# Patient Record
Sex: FEMALE | Race: WHITE | NOT HISPANIC OR LATINO
[De-identification: names, ages, dates, MRNs, and addresses within clinical notes are randomized per-mention and may not be internally consistent; named-entity substitution may affect disease eponyms.]

---

## 2021-11-14 ENCOUNTER — TRANSCRIPTION ENCOUNTER (OUTPATIENT)
Age: 21
End: 2021-11-14

## 2021-11-14 ENCOUNTER — APPOINTMENT (OUTPATIENT)
Dept: AFTER HOURS CARE | Facility: EMERGENCY ROOM | Age: 21
End: 2021-11-14
Payer: COMMERCIAL

## 2021-11-14 DIAGNOSIS — S09.90XA UNSPECIFIED INJURY OF HEAD, INITIAL ENCOUNTER: ICD-10-CM

## 2021-11-14 PROBLEM — Z00.00 ENCOUNTER FOR PREVENTIVE HEALTH EXAMINATION: Status: ACTIVE | Noted: 2021-11-14

## 2021-11-14 PROCEDURE — 99203 OFFICE O/P NEW LOW 30 MIN: CPT | Mod: 95

## 2021-11-15 PROBLEM — S09.90XA MINOR HEAD INJURY WITHOUT LOSS OF CONSCIOUSNESS, INITIAL ENCOUNTER: Status: ACTIVE | Noted: 2021-11-15

## 2021-11-15 NOTE — HISTORY OF PRESENT ILLNESS
[Home] : at home, [unfilled] , at the time of the visit. [Other Location: e.g. Home (Enter Location, City,State)___] : at [unfilled] [Verbal consent obtained from patient] : the patient, [unfilled] [FreeTextEntry8] : 21F hx concussion ~6mo ago now p/w mild frontal HA x1d, worse with bright lights, which started after she bumped her head on a car door. No LOC, amnesia, blood thinners, sz, n/v/d. Pt st her sx are more mild than prior concussion. \par LMP 10/21

## 2021-11-15 NOTE — PLAN
[No new medications perscribed] : Treat in place: No new medications prescribed [FreeTextEntry1] : extensive anticipatory guidance\par apap\par work note\par pmd follow up

## 2021-11-20 ENCOUNTER — APPOINTMENT (OUTPATIENT)
Dept: AFTER HOURS CARE | Facility: EMERGENCY ROOM | Age: 21
End: 2021-11-20
Payer: COMMERCIAL

## 2021-11-20 DIAGNOSIS — F07.81 POSTCONCUSSIONAL SYNDROME: ICD-10-CM

## 2021-11-20 PROCEDURE — 99213 OFFICE O/P EST LOW 20 MIN: CPT | Mod: 95

## 2021-11-20 NOTE — PLAN
[No new medications perscribed] : Treat in place: No new medications prescribed [FreeTextEntry1] : extensive discussion over concussion mgmnt - encouraging patience, slow escalation of return to full activity, and\par follow up\par work note\par apap if needed\par pmd follow up

## 2021-11-20 NOTE — HISTORY OF PRESENT ILLNESS
[Home] : at home, [unfilled] , at the time of the visit. [Other Location: e.g. Home (Enter Location, City,State)___] : at [unfilled] [Verbal consent obtained from patient] : the patient, [unfilled] [FreeTextEntry8] : 21F hx concussion ~6mo ago, seen 1wk ago by me for mild frontal HA x1d after she bumped her head on a car door. Pt now asking when she can begin to escalate her activity and screen time. Pt st she rarely gets a HA (always mild) and even then she isnt sure what is her anxiety and what is actually a mild HA. No visual changes, balance problems, motor/sensory deficits, falls, dizziness, photophobia, neck pain. No LOC, amnesia, blood thinners, sz, n/v/d. Pt st her sx are not only improving but more mild than prior concussion.

## 2023-08-04 ENCOUNTER — NON-APPOINTMENT (OUTPATIENT)
Age: 23
End: 2023-08-04

## 2023-08-09 ENCOUNTER — APPOINTMENT (OUTPATIENT)
Dept: GASTROENTEROLOGY | Facility: CLINIC | Age: 23
End: 2023-08-09
Payer: COMMERCIAL

## 2023-08-09 VITALS
HEART RATE: 90 BPM | BODY MASS INDEX: 22.04 KG/M2 | HEIGHT: 64 IN | RESPIRATION RATE: 16 BRPM | DIASTOLIC BLOOD PRESSURE: 88 MMHG | WEIGHT: 129.13 LBS | SYSTOLIC BLOOD PRESSURE: 138 MMHG | OXYGEN SATURATION: 98 %

## 2023-08-09 DIAGNOSIS — R13.19 OTHER DYSPHAGIA: ICD-10-CM

## 2023-08-09 DIAGNOSIS — F90.8 ATTENTION-DEFICIT HYPERACTIVITY DISORDER, OTHER TYPE: ICD-10-CM

## 2023-08-09 DIAGNOSIS — K21.00 GASTRO-ESOPHAGEAL REFLUX DISEASE WITH ESOPHAGITIS, WITHOUT BLEEDING: ICD-10-CM

## 2023-08-09 DIAGNOSIS — F90.1 ATTENTION-DEFICIT HYPERACTIVITY DISORDER, PREDOMINANTLY HYPERACTIVE TYPE: ICD-10-CM

## 2023-08-09 PROCEDURE — 99204 OFFICE O/P NEW MOD 45 MIN: CPT

## 2023-08-09 RX ORDER — DUPILUMAB 300 MG/2ML
300 INJECTION, SOLUTION SUBCUTANEOUS
Refills: 0 | Status: ACTIVE | COMMUNITY

## 2023-08-09 RX ORDER — DEXTROAMPHETAMINE SACCHARATE, AMPHETAMINE ASPARTATE, DEXTROAMPHETAMINE SULFATE, AND AMPHETAMINE SULFATE 5; 5; 5; 5 MG/1; MG/1; MG/1; MG/1
20 TABLET ORAL
Refills: 0 | Status: ACTIVE | COMMUNITY

## 2023-08-09 RX ORDER — EPINEPHRINE 0.3 MG/.3ML
0.3 INJECTION INTRAMUSCULAR
Refills: 0 | Status: ACTIVE | COMMUNITY

## 2023-08-09 NOTE — CONSULT LETTER
[Dear  ___] : Dear  [unfilled], [Consult Letter:] : I had the pleasure of evaluating your patient, [unfilled]. [Please see my note below.] : Please see my note below. [Consult Closing:] : Thank you very much for allowing me to participate in the care of this patient.  If you have any questions, please do not hesitate to contact me. [Sincerely,] : Sincerely, [FreeTextEntry3] : Arely Root M.D.

## 2023-08-09 NOTE — HISTORY OF PRESENT ILLNESS
[FreeTextEntry1] : 21 yo F  here for EoE.   Diagnosed with EoE in 2020 with EGD with symptoms of dysphagia.  tried omeprazole 40 mg for over 1 year, repeat EGD late 2022 - felt better but path showed didnt improve very much.  ongoing mild dysphagia. Then saw allergist and started dupixent in Feb. Feeling much better, but unsure if her disease is in remission. still occasional dysphagia.  reports loose stools, assoc w/ her menstrual cycle.   Last colonoscopy:;  Soc: no tobacco or significant EtOH  Family Hx: no significant GI family history including colon cancer, stomach cancer, IBD, celiac  ROS: constitutional: no weight loss, fevers ENT: no deafness Eyes: no blindness Neck: no lymphadenopathy Chest: no shortness of breath, no cough Cardiac: no chest pain, no palpitations Vascular: no leg swelling GI: no abdominal pain, nausea, vomiting, diarrhea, constipation, rectal bleeding, melena, dysphagia,  unless otherwise noted in HPI : no dysuria, dark urine Skin: no rashes, lesions, jaundice Heme: no bleeding Endocrine: no DM  unless otherwise stated in HPI  Physical Exam: (VS noted below) General: alert, comfortable, in no acute distress Eyes: normal sclera, anicteric Neck: normal, supple, no neck mass Pulm: no respiratory distress, clear to auscultation bilaterally  Heart: RRR, normal S1 S2 Abd: Soft, non-tender, non-distended, normal bowel sounds, no appreciable hepatosplenomegaly, no masses palpated Rectal: deferred Ext: warm and well perfused, no edema Skin: no rashes, no juandice Neuro: alert, grossly nonfocal  Labs/imaging/prior endoscopic results were reviewed to the extent available and pertinent findings noted in HPI

## 2023-08-09 NOTE — ASSESSMENT
[FreeTextEntry1] : 21 yo F hx of EoE, on Dupixent. failed PPI x 1 year.  ongoing mild dysphagia.  recommend EGD f-  - recommend EGD to document if responding adequately to medication - cont dupixent - also take bx for celiac   Will plan on an upper endoscopy for  EoE, dysphagia - failed PPI x 1 year  (also biopsy for celiac given loose stools). Explained nature of procedure and the risks/benefits/alternative including but not limited to bleeding, infection, perforation, missed lesions, anesthesia complications. Patient understands and agrees, all questions answered.

## 2023-08-10 ENCOUNTER — RESULT REVIEW (OUTPATIENT)
Age: 23
End: 2023-08-10

## 2023-08-11 ENCOUNTER — RESULT REVIEW (OUTPATIENT)
Age: 23
End: 2023-08-11

## 2023-08-11 ENCOUNTER — APPOINTMENT (OUTPATIENT)
Dept: GASTROENTEROLOGY | Facility: HOSPITAL | Age: 23
End: 2023-08-11
Payer: COMMERCIAL

## 2023-08-11 PROCEDURE — 43239 EGD BIOPSY SINGLE/MULTIPLE: CPT

## 2023-09-19 ENCOUNTER — APPOINTMENT (OUTPATIENT)
Dept: GASTROENTEROLOGY | Facility: CLINIC | Age: 23
End: 2023-09-19
Payer: COMMERCIAL

## 2023-09-19 DIAGNOSIS — K20.0 EOSINOPHILIC ESOPHAGITIS: ICD-10-CM

## 2023-09-19 PROCEDURE — 99213 OFFICE O/P EST LOW 20 MIN: CPT | Mod: 95
